# Patient Record
Sex: MALE | URBAN - METROPOLITAN AREA
[De-identification: names, ages, dates, MRNs, and addresses within clinical notes are randomized per-mention and may not be internally consistent; named-entity substitution may affect disease eponyms.]

---

## 2022-01-17 ENCOUNTER — OFFICE VISIT (OUTPATIENT)
Dept: URBAN - METROPOLITAN AREA CLINIC 15 | Facility: CLINIC | Age: 67
End: 2022-01-17
Payer: MEDICARE

## 2022-01-17 DIAGNOSIS — H16.011 CENTRAL CORNEAL ULCER, RIGHT EYE: Primary | ICD-10-CM

## 2022-01-17 PROCEDURE — 99205 OFFICE O/P NEW HI 60 MIN: CPT | Performed by: OPTOMETRIST

## 2022-01-17 RX ORDER — MOXIFLOXACIN 5 MG/ML
0.5 % SOLUTION/ DROPS OPHTHALMIC
Qty: 5 | Refills: 2 | Status: ACTIVE
Start: 2022-01-17

## 2022-01-17 ASSESSMENT — INTRAOCULAR PRESSURE
OD: 17
OS: 15

## 2022-01-17 NOTE — IMPRESSION/PLAN
Impression: Central corneal ulcer, right eye: H16.011. Plan: Patient was remodeling bathroom and demolishing old shower when piece of the ceramic tile went into eye. Patient was seen at emergency department where they performed eye flush. Tobradex and Ketorolac were both prescribed QID OD, patient states condition is worsening. Discussed likely poor visual prognosis given centrality and severity. Also discussed possibility of fungal/acanthamoeba given type of initial injury. Educated patient on exam findings and discussed importance of prompt treatment. D/C Ketorolac and Tobradex, start Moxifloxacin gtts [id number] for first 3 hours, then Q 30 mins for next 24 hours. Besivance given in office today to be used Q3hrs until patient can obtain moxifloxacin.  

RTC 1 day for f/u with Dr. Matty Monte

## 2022-01-18 ENCOUNTER — OFFICE VISIT (OUTPATIENT)
Dept: URBAN - METROPOLITAN AREA CLINIC 52 | Facility: CLINIC | Age: 67
End: 2022-01-18
Payer: MEDICARE

## 2022-01-18 PROCEDURE — 99214 OFFICE O/P EST MOD 30 MIN: CPT | Performed by: OPTOMETRIST

## 2022-01-18 NOTE — IMPRESSION/PLAN
Impression: Central corneal ulcer, right eye: H16.011. Plan: Shows improvement on Maxitrol [id number] waking hours, will continue maxitrol Q1h around the clock. Patient is unable to make come in tomorrow due to transportation constraints, will see patient first appointment Thurs Morning. Advised patient that if condition worsens will need to see patient tomorrow. Discussed likelihood of corneal scarring and guarded visual prognosis. Patient demonstrated understanding.

## 2022-01-20 ENCOUNTER — OFFICE VISIT (OUTPATIENT)
Dept: URBAN - METROPOLITAN AREA CLINIC 15 | Facility: CLINIC | Age: 67
End: 2022-01-20
Payer: MEDICARE

## 2022-01-20 PROCEDURE — 99214 OFFICE O/P EST MOD 30 MIN: CPT | Performed by: OPTOMETRIST

## 2022-01-20 ASSESSMENT — INTRAOCULAR PRESSURE
OD: 16
OS: 16

## 2022-01-20 NOTE — IMPRESSION/PLAN
Impression: Central corneal ulcer, right eye: H16.011. Plan: Patient was remodeling bathroom and demolishing old shower when fragment of old ceramic tile went into OD. Patient was seen at emergency department where they performed eye flush. Tobradex and Ketorolac were both prescribed QID OD which patient states did not help. No foreign body noted at initial exam or any follow up exams. Besivance given in office at initial appointment on 1/17/21 to be used Q3hrs until patient could obtain moxifloxacin, moxifloxacin was obtained and started ~2 hours after initial appointment. Condition not improving with topical moxifloxacin Q1hr around the clock for 3 days. Patient notes increase in light sensitivity and pain, decrease in vision. History, centrality, lack of response with 4th gen fluoroquinolone, and infiltrates concentrated peripherally suggests ulcer may not bacterial in origin, possible acanthamoeba (or less likely fungal). Patient (+) Type 2 Diabetes Mellitus. Will refer to cornea on urgent basis for culture and treatment. Advised patient to stop moxifloxacin 24Hr prior to appt with cornea so as to not confound culture. Discussed likely poor visual prognosis given centrality and severity. Also discussed possibility of fungal/acanthamoeba given type of initial injury, patient understands. Patient expresses transportation constraints and treatment may be limited by patient's ability to secure transportation to and from appointments.